# Patient Record
Sex: MALE | Race: WHITE | NOT HISPANIC OR LATINO | Employment: FULL TIME | URBAN - METROPOLITAN AREA
[De-identification: names, ages, dates, MRNs, and addresses within clinical notes are randomized per-mention and may not be internally consistent; named-entity substitution may affect disease eponyms.]

---

## 2023-06-05 ENCOUNTER — OFFICE VISIT (OUTPATIENT)
Dept: FAMILY MEDICINE CLINIC | Facility: CLINIC | Age: 33
End: 2023-06-05
Payer: COMMERCIAL

## 2023-06-05 VITALS
OXYGEN SATURATION: 97 % | DIASTOLIC BLOOD PRESSURE: 84 MMHG | HEART RATE: 87 BPM | WEIGHT: 212.4 LBS | TEMPERATURE: 98.4 F | SYSTOLIC BLOOD PRESSURE: 123 MMHG | HEIGHT: 71 IN | BODY MASS INDEX: 29.73 KG/M2

## 2023-06-05 DIAGNOSIS — Z00.00 ENCOUNTER FOR ANNUAL PHYSICAL EXAM: Primary | ICD-10-CM

## 2023-06-05 DIAGNOSIS — Z23 NEED FOR VACCINATION: ICD-10-CM

## 2023-06-05 DIAGNOSIS — K21.9 GASTROESOPHAGEAL REFLUX DISEASE WITHOUT ESOPHAGITIS: ICD-10-CM

## 2023-06-05 RX ORDER — PANTOPRAZOLE SODIUM 40 MG/1
40 TABLET, DELAYED RELEASE ORAL DAILY
Qty: 90 TABLET | Refills: 3 | Status: SHIPPED | OUTPATIENT
Start: 2023-06-05

## 2023-06-05 RX ORDER — PANTOPRAZOLE SODIUM 40 MG/1
40 TABLET, DELAYED RELEASE ORAL
COMMUNITY
End: 2023-06-05 | Stop reason: SDUPTHER

## 2023-06-05 NOTE — PROGRESS NOTES
Subjective     Chief Complaint  Establish Care, GI Problem (Pt has had all the test, upper and lower scope/He wants to do a food allergy test), and Labs Only (Pt wants to check his hemoglobin)    Subjective          Kurt Sebastian is a 32 y.o. male who presents today to [facility] for initial evaluation .    HPI:     Mr. Sebastian is a very pleasant 32 year old male who presents today to establish care with a PCP.  He has a history of GERD and myocarditis (10 years ago), at some point he also had an episode of elevated hemoglobin.  He has not labs for quite some time.  He has been traveling a lot for work over the last 2 years.  He did have a EGD and colonoscopy sometime about 1 year ago in [place].        The following portions of the patient's history were reviewed and updated as appropriate: allergies, current medications, past family history, past medical history, past social history, past surgical history and problem list.    Objective     Objective     Allergy:   Allergies   Allergen Reactions    Hydrocodone Anaphylaxis and Itching     Other reaction(s): Anaphylactic shock-Allergy        Current Medications:   Current Outpatient Medications   Medication Sig Dispense Refill    pantoprazole (PROTONIX) 40 MG EC tablet Take 1 tablet by mouth Daily. 90 tablet 3     No current facility-administered medications for this visit.       Past Medical History:  Past Medical History:   Diagnosis Date    Anxiety 2018    Anxiety from time to time    GERD (gastroesophageal reflux disease) N/A    As long as i can remember    Headache N/A    As long as i can remember    History of medical problems 3-4 years    Severe stomach issues. Been treated by a GI. Upper GI and colonoscopy performed, still no improvement.    Low back pain 2016       Past Surgical History:  Past Surgical History:   Procedure Laterality Date    COLONOSCOPY  2022    JOINT REPLACEMENT  2012 & 2017    Acl both knees  "      Social History:  Social History     Socioeconomic History    Marital status: Single   Tobacco Use    Smoking status: Never     Passive exposure: Never    Smokeless tobacco: Never    Tobacco comments:     Use chewing tobacco daily   Substance and Sexual Activity    Alcohol use: Yes     Alcohol/week: 5.0 - 6.0 standard drinks     Types: 5 - 6 Cans of beer per week    Drug use: Yes     Frequency: 2.0 times per week     Types: Marijuana    Sexual activity: Yes     Partners: Female     Birth control/protection: None       Family History:  Family History   Problem Relation Age of Onset    Arthritis Father          Vital Signs:   /84   Pulse 87   Temp 98.4 °F (36.9 °C) (Infrared)   Ht 180.3 cm (71\")   Wt 96.3 kg (212 lb 6.4 oz)   SpO2 97%   BMI 29.62 kg/m²      Physical Exam:  Physical Exam  Constitutional:       Appearance: He is not ill-appearing.   Eyes:      Pupils: Pupils are equal, round, and reactive to light.   Cardiovascular:      Rate and Rhythm: Normal rate.      Pulses: Normal pulses.   Pulmonary:      Effort: Pulmonary effort is normal.      Breath sounds: Normal breath sounds.   Neurological:      General: No focal deficit present.      Mental Status: He is alert. Mental status is at baseline.   Psychiatric:         Mood and Affect: Mood normal.         Behavior: Behavior normal.         Thought Content: Thought content normal.         Judgment: Judgment normal.             PHQ-9 Score  PHQ-9 Total Score: 0     Lab Review  No results found for any previous visit.        Radiology Results        Assessment / Plan         Assessment and Plan   Diagnoses and all orders for this visit:    1. Encounter for annual physical exam (Primary)  Assessment & Plan:  Annual wellness exam completed today. Health Maintenance including immunizations was updated and reflected in the chart. Yearly screening labs were ordered.     Further recommendations to be given once lab data received.     Health advice: " healthy food choices with fresh fruits and vegetables, maintain sleep pattern at least 8 hours, avoid texting and distracted driving practices; wear safety belt, engage in regular exercise, maintain healthy weight, use safe sex practices, avoid alcohol and illicit drugs. Maintain immunizations that are up to date. Maintain health maintenance: etc.  Follow up with PCP if struggling with depression or anxiety. Keep regular dental and eye exams. Brush and floss teeth daily.     F/U annually and prn.       Orders:  -     CBC & Differential; Future  -     Comprehensive Metabolic Panel; Future  -     Lipid Panel; Future  -     TSH Rfx On Abnormal To Free T4; Future    2. Gastroesophageal reflux disease without esophagitis  -     pantoprazole (PROTONIX) 40 MG EC tablet; Take 1 tablet by mouth Daily.  Dispense: 90 tablet; Refill: 3    3. Need for vaccination  -     Tdap Vaccine Greater Than or Equal To 8yo IM        Discussed possible differential diagnoses, testing, treatment, recommended non-pharmacological interventions, risks, warning signs to monitor for that would indicate need for follow-up in clinic or ER. If no improvement with these regimens or you have new or worsening symptoms follow-up. Patient verbalizes understanding and agreement with plan of care. Denies further needs or concerns.     Patient was given instructions and counseling regarding her condition and for health maintenance advised.    BMI is >= 25 and <30. (Overweight) The following options were offered after discussion;: weight loss educational material (shared in after visit summary)           Health Maintenance  Health Maintenance:   Health Maintenance Due   Topic Date Due    TDAP/TD VACCINES (2 - Td or Tdap) 07/25/2021    HEPATITIS C SCREENING  Never done        Meds ordered during this visit  New Medications Ordered This Visit   Medications    pantoprazole (PROTONIX) 40 MG EC tablet     Sig: Take 1 tablet by mouth Daily.     Dispense:  90 tablet      Refill:  3       Meds stopped during this visit:  Medications Discontinued During This Encounter   Medication Reason    pantoprazole (PROTONIX) 40 MG EC tablet Reorder        Visit Diagnoses    ICD-10-CM ICD-9-CM   1. Encounter for annual physical exam  Z00.00 V70.0   2. Gastroesophageal reflux disease without esophagitis  K21.9 530.81   3. Need for vaccination  Z23 V05.9       Patient was given instructions and counseling regarding his condition or for health maintenance advice. Please see specific information pulled into the AVS if appropriate.     Follow Up   Return in about 1 year (around 6/5/2024) for Annual.      This document has been electronically signed by Mine Walters DO   June 5, 2023 14:00 EDT    Dictated Utilizing Dragon Dictation: Part of this note may be an electronic transcription/translation of spoken language to printed text using the Dragon Dictation System.    Mine Walters D.O.  [facility]

## 2023-06-05 NOTE — ASSESSMENT & PLAN NOTE
Annual wellness exam completed today. Health Maintenance including immunizations was updated and reflected in the chart. Yearly screening labs were ordered.     Further recommendations to be given once lab data received.     Health advice: healthy food choices with fresh fruits and vegetables, maintain sleep pattern at least 8 hours, avoid texting and distracted driving practices; wear safety belt, engage in regular exercise, maintain healthy weight, use safe sex practices, avoid alcohol and illicit drugs. Maintain immunizations that are up to date. Maintain health maintenance: etc.  Follow up with PCP if struggling with depression or anxiety. Keep regular dental and eye exams. Brush and floss teeth daily.     F/U annually and prn.

## 2023-06-07 ENCOUNTER — LAB (OUTPATIENT)
Dept: LAB | Facility: HOSPITAL | Age: 33
End: 2023-06-07
Payer: COMMERCIAL

## 2023-06-07 DIAGNOSIS — Z00.00 ENCOUNTER FOR ANNUAL PHYSICAL EXAM: ICD-10-CM

## 2023-06-07 LAB
ALBUMIN SERPL-MCNC: 4.7 G/DL (ref 3.5–5.2)
ALBUMIN/GLOB SERPL: 1.7 G/DL
ALP SERPL-CCNC: 54 U/L (ref 39–117)
ALT SERPL W P-5'-P-CCNC: 35 U/L (ref 1–41)
ANION GAP SERPL CALCULATED.3IONS-SCNC: 11.4 MMOL/L (ref 5–15)
AST SERPL-CCNC: 26 U/L (ref 1–40)
BASOPHILS # BLD AUTO: 0.03 10*3/MM3 (ref 0–0.2)
BASOPHILS NFR BLD AUTO: 0.5 % (ref 0–1.5)
BILIRUB SERPL-MCNC: 1.3 MG/DL (ref 0–1.2)
BUN SERPL-MCNC: 16 MG/DL (ref 6–20)
BUN/CREAT SERPL: 12 (ref 7–25)
CALCIUM SPEC-SCNC: 10.1 MG/DL (ref 8.6–10.5)
CHLORIDE SERPL-SCNC: 103 MMOL/L (ref 98–107)
CHOLEST SERPL-MCNC: 203 MG/DL (ref 0–200)
CO2 SERPL-SCNC: 25.6 MMOL/L (ref 22–29)
CREAT SERPL-MCNC: 1.33 MG/DL (ref 0.76–1.27)
DEPRECATED RDW RBC AUTO: 39.1 FL (ref 37–54)
EGFRCR SERPLBLD CKD-EPI 2021: 73.7 ML/MIN/1.73
EOSINOPHIL # BLD AUTO: 0.11 10*3/MM3 (ref 0–0.4)
EOSINOPHIL NFR BLD AUTO: 1.7 % (ref 0.3–6.2)
ERYTHROCYTE [DISTWIDTH] IN BLOOD BY AUTOMATED COUNT: 11.9 % (ref 12.3–15.4)
GLOBULIN UR ELPH-MCNC: 2.7 GM/DL
GLUCOSE SERPL-MCNC: 73 MG/DL (ref 65–99)
HCT VFR BLD AUTO: 55.6 % (ref 37.5–51)
HDLC SERPL-MCNC: 43 MG/DL (ref 40–60)
HGB BLD-MCNC: 18.8 G/DL (ref 13–17.7)
IMM GRANULOCYTES # BLD AUTO: 0.02 10*3/MM3 (ref 0–0.05)
IMM GRANULOCYTES NFR BLD AUTO: 0.3 % (ref 0–0.5)
LDLC SERPL CALC-MCNC: 140 MG/DL (ref 0–100)
LDLC/HDLC SERPL: 3.2 {RATIO}
LYMPHOCYTES # BLD AUTO: 1.9 10*3/MM3 (ref 0.7–3.1)
LYMPHOCYTES NFR BLD AUTO: 29.3 % (ref 19.6–45.3)
MCH RBC QN AUTO: 30.7 PG (ref 26.6–33)
MCHC RBC AUTO-ENTMCNC: 33.8 G/DL (ref 31.5–35.7)
MCV RBC AUTO: 90.7 FL (ref 79–97)
MONOCYTES # BLD AUTO: 0.57 10*3/MM3 (ref 0.1–0.9)
MONOCYTES NFR BLD AUTO: 8.8 % (ref 5–12)
NEUTROPHILS NFR BLD AUTO: 3.85 10*3/MM3 (ref 1.7–7)
NEUTROPHILS NFR BLD AUTO: 59.4 % (ref 42.7–76)
NRBC BLD AUTO-RTO: 0 /100 WBC (ref 0–0.2)
PLATELET # BLD AUTO: 251 10*3/MM3 (ref 140–450)
PMV BLD AUTO: 9.9 FL (ref 6–12)
POTASSIUM SERPL-SCNC: 4.2 MMOL/L (ref 3.5–5.2)
PROT SERPL-MCNC: 7.4 G/DL (ref 6–8.5)
RBC # BLD AUTO: 6.13 10*6/MM3 (ref 4.14–5.8)
SODIUM SERPL-SCNC: 140 MMOL/L (ref 136–145)
TRIGL SERPL-MCNC: 113 MG/DL (ref 0–150)
TSH SERPL DL<=0.05 MIU/L-ACNC: 2.96 UIU/ML (ref 0.27–4.2)
VLDLC SERPL-MCNC: 20 MG/DL (ref 5–40)
WBC NRBC COR # BLD: 6.48 10*3/MM3 (ref 3.4–10.8)

## 2023-06-07 PROCEDURE — 80061 LIPID PANEL: CPT

## 2023-06-07 PROCEDURE — 80050 GENERAL HEALTH PANEL: CPT

## 2023-06-08 DIAGNOSIS — D58.2 ELEVATED HEMOGLOBIN: Primary | ICD-10-CM

## 2023-06-09 ENCOUNTER — PATIENT ROUNDING (BHMG ONLY) (OUTPATIENT)
Dept: FAMILY MEDICINE CLINIC | Facility: CLINIC | Age: 33
End: 2023-06-09
Payer: COMMERCIAL

## 2023-07-24 ENCOUNTER — CONSULT (OUTPATIENT)
Dept: ONCOLOGY | Facility: CLINIC | Age: 33
End: 2023-07-24
Payer: COMMERCIAL

## 2023-07-24 VITALS
TEMPERATURE: 98.4 F | WEIGHT: 209 LBS | SYSTOLIC BLOOD PRESSURE: 125 MMHG | OXYGEN SATURATION: 96 % | HEART RATE: 78 BPM | BODY MASS INDEX: 29.26 KG/M2 | RESPIRATION RATE: 16 BRPM | DIASTOLIC BLOOD PRESSURE: 80 MMHG | HEIGHT: 71 IN

## 2023-07-24 DIAGNOSIS — Z00.00 ENCOUNTER FOR ANNUAL PHYSICAL EXAM: Primary | ICD-10-CM

## 2023-07-24 DIAGNOSIS — D75.1 ERYTHROCYTOSIS: ICD-10-CM

## 2023-07-24 PROCEDURE — 99204 OFFICE O/P NEW MOD 45 MIN: CPT | Performed by: INTERNAL MEDICINE

## 2023-07-24 RX ORDER — OMEPRAZOLE 40 MG/1
1 CAPSULE, DELAYED RELEASE ORAL DAILY
COMMUNITY

## 2023-07-24 RX ORDER — TRAZODONE HYDROCHLORIDE 50 MG/1
50 TABLET ORAL AS NEEDED
COMMUNITY
Start: 2023-07-03

## 2023-07-24 NOTE — LETTER
July 24, 2023       No Recipients    Patient: Kurt Sebastian   YOB: 1990   Date of Visit: 7/24/2023     Dear Kavon Deras, DO:       Thank you for referring Kurt Sebastian to me for evaluation. Below are the relevant portions of my assessment and plan of care.    If you have questions, please do not hesitate to call me. I look forward to following Kurt along with you.         Sincerely,        Reno Galan MD        CC:   No Recipients    Reno Galan MD  07/24/23 1129  Sign when Signing Visit  CHIEF COMPLAINT: Isolated erythrocytosis    REASON FOR REFERRAL: Isolated erythrocytosis      RECORDS OBTAINED  Records of the patients history including those obtained from primary care were reviewed and summarized in detail.    Oncology/Hematology History Overview Note   1.  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  Dyspepsia with EGD and colonoscopy reportedly in [place] in 2022 data deficit  3.  Remote history of myocarditis    Hematology history timeline:  -7/16/2018 hematocrit 52% white count 14,300 otherwise unremarkable CBC  - 9/29/2022 sigmoid polyp tubular adenoma and benign small bowel mucosa with no other colonic pathology  -6/7/2023 hematocrit 55.6% otherwise unremarkable CBC.    -7/24/2023 [facility] hematology consult: I reviewed with him the pathophysiologic mechanisms and differences between secondary erythrocytosis, erythropoietic producing tumors, and myeloproliferative disorders.  He is very unlikely to have the latter at his young age and has no splenomegaly.  In discussing the different potential mechanisms of erythrocytosis, he admits to taking very large doses of testosterone throughout the last decade and had done bodybuilding in the past.  Presently he is trying to moderate that and has cut his black market procured injections in half and he has agreed to cut that in half again for the next month and then stop and then we will go 6 months and I will see him back with a CBC just prior to  return and assuming the hematocrit corrects itself with cessation of the testosterone then that is both diagnostic and therapeutic.  We discussed the cardiovascular implications of testosterone supplementation when there is not a testosterone deficiency.  We talked about the fact that it may take a while for his pituitary to regulate appropriately his testosterone levels and to be patient with himself.  I would not use Clomid or anastrozole or other hormonal manipulations like beta-hCG etc. that in the bodybuilding literature has been discussed with him as a bridge to coming off testosterone.  Rather, I would simply continue his taper and stop.  If he has persistent erythrocytosis when I see him back in 7 months, I will check his testosterone level to make sure that it has dropped appropriately.  If it is low but his erythrocytosis persists then we will look for other causes of secondary erythrocytosis and do the molecular testing for myeloproliferative disorder.     Erythrocytosis       HISTORY OF PRESENT ILLNESS:  The patient is a 32 y.o.  male, referred for erythrocytosis    REVIEW OF SYSTEMS:  No headaches.  No erythromelalgia or pruritus.  No early satiety.  No claudication.  No mental status changes.  No heart attacks or strokes.    Past Medical History:   Diagnosis Date   • Anxiety 2018    Anxiety from time to time   • GERD (gastroesophageal reflux disease) N/A    As long as i can remember   • Headache N/A    As long as i can remember   • History of medical problems 3-4 years    Severe stomach issues. Been treated by a GI. Upper GI and colonoscopy performed, still no improvement.   • Low back pain 2016     Past Surgical History:   Procedure Laterality Date   • COLONOSCOPY  2022   • JOINT REPLACEMENT  2012 & 2017    Acl both knees       Current Outpatient Medications on File Prior to Visit   Medication Sig Dispense Refill   • pantoprazole (PROTONIX) 40 MG EC tablet Take 1 tablet by mouth Daily. 90 tablet 3   •  "traZODone (DESYREL) 50 MG tablet Take 1 tablet by mouth As Needed.     • omeprazole (priLOSEC) 40 MG capsule Take 1 capsule by mouth Daily. (Patient not taking: Reported on 7/24/2023)       No current facility-administered medications on file prior to visit.       Allergies   Allergen Reactions   • Hydrocodone Anaphylaxis and Itching     Other reaction(s): Anaphylactic shock-Allergy       Social History     Socioeconomic History   • Marital status: Single   Tobacco Use   • Smoking status: Never     Passive exposure: Never   • Smokeless tobacco: Never   • Tobacco comments:     Use chewing tobacco daily   Substance and Sexual Activity   • Alcohol use: Yes     Alcohol/week: 5.0 - 6.0 standard drinks     Types: 5 - 6 Cans of beer per week   • Drug use: Yes     Frequency: 2.0 times per week     Types: Marijuana   • Sexual activity: Yes     Partners: Female     Birth control/protection: None       Family History   Problem Relation Age of Onset   • Arthritis Father        PHYSICAL EXAM:  No plethora.  No jaundice or icterus.  No respiratory distress.  No palpable hepatosplenomegaly or lymphadenopathy in the cervical axillary or inguinal region.    /80   Pulse 78   Temp 98.4 °F (36.9 °C)   Resp 16   Ht 180.3 cm (71\")   Wt 94.8 kg (209 lb)   SpO2 96%   BMI 29.15 kg/m²     ECOG score: 0           ECOG: (0) Fully Active - Able to Carry On All Pre-disease Performance Without Restriction    Lab Results   Component Value Date    HGB 18.8 (H) 06/07/2023    HCT 55.6 (H) 06/07/2023    MCV 90.7 06/07/2023     06/07/2023    WBC 6.48 06/07/2023    NEUTROABS 3.85 06/07/2023    LYMPHSABS 1.90 06/07/2023    MONOSABS 0.57 06/07/2023    EOSABS 0.11 06/07/2023    BASOSABS 0.03 06/07/2023     Lab Results   Component Value Date    GLUCOSE 73 06/07/2023    BUN 16 06/07/2023    CREATININE 1.33 (H) 06/07/2023     06/07/2023    K 4.2 06/07/2023     06/07/2023    CO2 25.6 06/07/2023    CALCIUM 10.1 06/07/2023    " PROTEINTOT 7.4 06/07/2023    ALBUMIN 4.7 06/07/2023    BILITOT 1.3 (H) 06/07/2023    ALKPHOS 54 06/07/2023    AST 26 06/07/2023    ALT 35 06/07/2023         Assessment & Plan   1.  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  Dyspepsia with EGD and colonoscopy reportedly in [place] in 2022 data deficit  3.  Remote history of myocarditis    Hematology history timeline:  -7/16/2018 hematocrit 52% white count 14,300 otherwise unremarkable CBC  - 9/29/2022 sigmoid polyp tubular adenoma and benign small bowel mucosa with no other colonic pathology  -6/7/2023 hematocrit 55.6% otherwise unremarkable CBC.    -7/24/2023 [facility] hematology consult: I reviewed with him the pathophysiologic mechanisms and differences between secondary erythrocytosis, erythropoietic producing tumors, and myeloproliferative disorders.  He is very unlikely to have the latter at his young age and has no splenomegaly.  In discussing the different potential mechanisms of erythrocytosis, he admits to taking very large doses of testosterone throughout the last decade and had done bodybuilding in the past.  Presently he is trying to moderate that and has cut his black market procured injections in half and he has agreed to cut that in half again for the next month and then stop and then we will go 6 months and I will see him back with a CBC just prior to return and assuming the hematocrit corrects itself with cessation of the testosterone then that is both diagnostic and therapeutic.  We discussed the cardiovascular implications of testosterone supplementation when there is not a testosterone deficiency.  We talked about the fact that it may take a while for his pituitary to regulate appropriately his testosterone levels and to be patient with himself.  I would not use Clomid or anastrozole or other hormonal manipulations like beta-hCG etc. that in the bodybuilding literature has been discussed with him as a bridge to coming off testosterone.  Rather, I would  simply continue his taper and stop.  If he has persistent erythrocytosis when I see him back in 7 months, I will check his testosterone level to make sure that it has dropped appropriately.  If it is low but his erythrocytosis persists then we will look for other causes of secondary erythrocytosis and do the molecular testing for myeloproliferative disorder.    Total time of care today inclusive of time spent today prior to patient's arrival reviewing past blood work and during visit interviewing him as to signs or symptoms of his testosterone use and implications thereof and effects on the hematocrit and the various causes for erythrocytosis and after visit instituting the plan as outlined above took 50 minutes of patient care time throughout the day today.      Reno Galan MD    7/24/2023

## 2023-07-24 NOTE — PROGRESS NOTES
CHIEF COMPLAINT: Isolated erythrocytosis    REASON FOR REFERRAL: Isolated erythrocytosis      RECORDS OBTAINED  Records of the patients history including those obtained from primary care were reviewed and summarized in detail.    Oncology/Hematology History Overview Note   1.  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  Dyspepsia with EGD and colonoscopy reportedly in [place] in 2022 data deficit  3.  Remote history of myocarditis    Hematology history timeline:  -7/16/2018 hematocrit 52% white count 14,300 otherwise unremarkable CBC  - 9/29/2022 sigmoid polyp tubular adenoma and benign small bowel mucosa with no other colonic pathology  -6/7/2023 hematocrit 55.6% otherwise unremarkable CBC.    -7/24/2023 [facility] consult: I reviewed with him the pathophysiologic mechanisms and differences between secondary erythrocytosis, erythropoietic producing tumors, and myeloproliferative disorders.  He is very unlikely to have the latter at his young age and has no splenomegaly.  In discussing the different potential mechanisms of erythrocytosis, he admits to taking very large doses of testosterone throughout the last decade and had done bodybuilding in the past.  Presently he is trying to moderate that and has cut his black market procured injections in half and he has agreed to cut that in half again for the next month and then stop and then we will go 6 months and I will see him back with a CBC just prior to return and assuming the hematocrit corrects itself with cessation of the testosterone then that is both diagnostic and therapeutic.  We discussed the cardiovascular implications of testosterone supplementation when there is not a testosterone deficiency.  We talked about the fact that it may take a while for his pituitary to regulate appropriately his testosterone levels and to be patient with himself.  I would not use Clomid or anastrozole or other hormonal manipulations like beta-hCG etc. that in the bodybuilding  literature has been discussed with him as a bridge to coming off testosterone.  Rather, I would simply continue his taper and stop.  If he has persistent erythrocytosis when I see him back in 7 months, I will check his testosterone level to make sure that it has dropped appropriately.  If it is low but his erythrocytosis persists then we will look for other causes of secondary erythrocytosis and do the molecular testing for myeloproliferative disorder.     Erythrocytosis       HISTORY OF PRESENT ILLNESS:  The patient is a 32 y.o.  male, referred for erythrocytosis    REVIEW OF SYSTEMS:  No headaches.  No erythromelalgia or pruritus.  No early satiety.  No claudication.  No mental status changes.  No heart attacks or strokes.    Past Medical History:   Diagnosis Date    Anxiety 2018    Anxiety from time to time    GERD (gastroesophageal reflux disease) N/A    As long as i can remember    Headache N/A    As long as i can remember    History of medical problems 3-4 years    Severe stomach issues. Been treated by a GI. Upper GI and colonoscopy performed, still no improvement.    Low back pain 2016     Past Surgical History:   Procedure Laterality Date    COLONOSCOPY  2022    JOINT REPLACEMENT  2012 & 2017    Acl both knees       Current Outpatient Medications on File Prior to Visit   Medication Sig Dispense Refill    pantoprazole (PROTONIX) 40 MG EC tablet Take 1 tablet by mouth Daily. 90 tablet 3    traZODone (DESYREL) 50 MG tablet Take 1 tablet by mouth As Needed.      omeprazole (priLOSEC) 40 MG capsule Take 1 capsule by mouth Daily. (Patient not taking: Reported on 7/24/2023)       No current facility-administered medications on file prior to visit.       Allergies   Allergen Reactions    Hydrocodone Anaphylaxis and Itching     Other reaction(s): Anaphylactic shock-Allergy       Social History     Socioeconomic History    Marital status: Single   Tobacco Use    Smoking status: Never     Passive exposure: Never     "Smokeless tobacco: Never    Tobacco comments:     Use chewing tobacco daily   Substance and Sexual Activity    Alcohol use: Yes     Alcohol/week: 5.0 - 6.0 standard drinks     Types: 5 - 6 Cans of beer per week    Drug use: Yes     Frequency: 2.0 times per week     Types: Marijuana    Sexual activity: Yes     Partners: Female     Birth control/protection: None       Family History   Problem Relation Age of Onset    Arthritis Father        PHYSICAL EXAM:  No plethora.  No jaundice or icterus.  No respiratory distress.  No palpable hepatosplenomegaly or lymphadenopathy in the cervical axillary or inguinal region.    /80   Pulse 78   Temp 98.4 °F (36.9 °C)   Resp 16   Ht 180.3 cm (71\")   Wt 94.8 kg (209 lb)   SpO2 96%   BMI 29.15 kg/m²     ECOG score: 0           ECOG: (0) Fully Active - Able to Carry On All Pre-disease Performance Without Restriction    Lab Results   Component Value Date    HGB 18.8 (H) 06/07/2023    HCT 55.6 (H) 06/07/2023    MCV 90.7 06/07/2023     06/07/2023    WBC 6.48 06/07/2023    NEUTROABS 3.85 06/07/2023    LYMPHSABS 1.90 06/07/2023    MONOSABS 0.57 06/07/2023    EOSABS 0.11 06/07/2023    BASOSABS 0.03 06/07/2023     Lab Results   Component Value Date    GLUCOSE 73 06/07/2023    BUN 16 06/07/2023    CREATININE 1.33 (H) 06/07/2023     06/07/2023    K 4.2 06/07/2023     06/07/2023    CO2 25.6 06/07/2023    CALCIUM 10.1 06/07/2023    PROTEINTOT 7.4 06/07/2023    ALBUMIN 4.7 06/07/2023    BILITOT 1.3 (H) 06/07/2023    ALKPHOS 54 06/07/2023    AST 26 06/07/2023    ALT 35 06/07/2023         Assessment & Plan   1.  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  Dyspepsia with EGD and colonoscopy reportedly in [place] in 2022 data deficit  3.  Remote history of myocarditis    Hematology history timeline:  -7/16/2018 hematocrit 52% white count 14,300 otherwise unremarkable CBC  - 9/29/2022 sigmoid polyp tubular adenoma and benign small bowel mucosa with no other colonic " pathology  -6/7/2023 hematocrit 55.6% otherwise unremarkable CBC.    -7/24/2023 [facility] hematology consult: I reviewed with him the pathophysiologic mechanisms and differences between secondary erythrocytosis, erythropoietic producing tumors, and myeloproliferative disorders.  He is very unlikely to have the latter at his young age and has no splenomegaly.  In discussing the different potential mechanisms of erythrocytosis, he admits to taking very large doses of testosterone throughout the last decade and had done bodybuilding in the past.  Presently he is trying to moderate that and has cut his black market procured injections in half and he has agreed to cut that in half again for the next month and then stop and then we will go 6 months and I will see him back with a CBC just prior to return and assuming the hematocrit corrects itself with cessation of the testosterone then that is both diagnostic and therapeutic.  We discussed the cardiovascular implications of testosterone supplementation when there is not a testosterone deficiency.  We talked about the fact that it may take a while for his pituitary to regulate appropriately his testosterone levels and to be patient with himself.  I would not use Clomid or anastrozole or other hormonal manipulations like beta-hCG etc. that in the bodybuilding literature has been discussed with him as a bridge to coming off testosterone.  Rather, I would simply continue his taper and stop.  If he has persistent erythrocytosis when I see him back in 7 months, I will check his testosterone level to make sure that it has dropped appropriately.  If it is low but his erythrocytosis persists then we will look for other causes of secondary erythrocytosis and do the molecular testing for myeloproliferative disorder.    Total time of care today inclusive of time spent today prior to patient's arrival reviewing past blood work and during visit interviewing him as to signs or symptoms of  his testosterone use and implications thereof and effects on the hematocrit and the various causes for erythrocytosis and after visit instituting the plan as outlined above took 50 minutes of patient care time throughout the day today.      Reno Galan MD    7/24/2023